# Patient Record
Sex: MALE | Race: WHITE | NOT HISPANIC OR LATINO | Employment: OTHER | URBAN - METROPOLITAN AREA
[De-identification: names, ages, dates, MRNs, and addresses within clinical notes are randomized per-mention and may not be internally consistent; named-entity substitution may affect disease eponyms.]

---

## 2019-04-04 ENCOUNTER — TELEPHONE (OUTPATIENT)
Dept: UROLOGY | Facility: MEDICAL CENTER | Age: 74
End: 2019-04-04

## 2019-05-01 ENCOUNTER — OFFICE VISIT (OUTPATIENT)
Dept: UROLOGY | Facility: AMBULATORY SURGERY CENTER | Age: 74
End: 2019-05-01
Payer: MEDICARE

## 2019-05-01 VITALS
BODY MASS INDEX: 34.16 KG/M2 | HEIGHT: 71 IN | WEIGHT: 244 LBS | HEART RATE: 48 BPM | DIASTOLIC BLOOD PRESSURE: 80 MMHG | SYSTOLIC BLOOD PRESSURE: 142 MMHG

## 2019-05-01 DIAGNOSIS — N40.1 BPH WITH OBSTRUCTION/LOWER URINARY TRACT SYMPTOMS: Primary | ICD-10-CM

## 2019-05-01 DIAGNOSIS — N13.8 BPH WITH OBSTRUCTION/LOWER URINARY TRACT SYMPTOMS: Primary | ICD-10-CM

## 2019-05-01 LAB
POST-VOID RESIDUAL VOLUME, ML POC: 65 ML
SL AMB  POCT GLUCOSE, UA: NORMAL
SL AMB LEUKOCYTE ESTERASE,UA: NORMAL
SL AMB POCT BILIRUBIN,UA: NORMAL
SL AMB POCT BLOOD,UA: NORMAL
SL AMB POCT CLARITY,UA: CLEAR
SL AMB POCT COLOR,UA: YELLOW
SL AMB POCT KETONES,UA: NORMAL
SL AMB POCT NITRITE,UA: NORMAL
SL AMB POCT PH,UA: 5
SL AMB POCT SPECIFIC GRAVITY,UA: 1.02
SL AMB POCT URINE PROTEIN: NORMAL
SL AMB POCT UROBILINOGEN: NORMAL

## 2019-05-01 PROCEDURE — 51798 US URINE CAPACITY MEASURE: CPT | Performed by: UROLOGY

## 2019-05-01 PROCEDURE — 99204 OFFICE O/P NEW MOD 45 MIN: CPT | Performed by: UROLOGY

## 2019-05-01 PROCEDURE — 81002 URINALYSIS NONAUTO W/O SCOPE: CPT | Performed by: UROLOGY

## 2019-05-01 RX ORDER — CIPROFLOXACIN 500 MG/1
500 TABLET, FILM COATED ORAL EVERY 12 HOURS SCHEDULED
Qty: 6 TABLET | Refills: 0 | Status: SHIPPED | OUTPATIENT
Start: 2019-05-01 | End: 2019-05-04

## 2019-05-01 RX ORDER — CANDESARTAN 16 MG/1
8 TABLET ORAL DAILY
Refills: 3 | COMMUNITY
Start: 2019-04-17

## 2019-05-02 ENCOUNTER — TELEPHONE (OUTPATIENT)
Dept: UROLOGY | Facility: AMBULATORY SURGERY CENTER | Age: 74
End: 2019-05-02

## 2019-07-15 ENCOUNTER — PROCEDURE VISIT (OUTPATIENT)
Dept: UROLOGY | Facility: AMBULATORY SURGERY CENTER | Age: 74
End: 2019-07-15
Payer: MEDICARE

## 2019-07-15 VITALS
WEIGHT: 244.8 LBS | SYSTOLIC BLOOD PRESSURE: 148 MMHG | HEART RATE: 82 BPM | DIASTOLIC BLOOD PRESSURE: 88 MMHG | HEIGHT: 71 IN | BODY MASS INDEX: 34.27 KG/M2

## 2019-07-15 DIAGNOSIS — N40.1 BPH WITH OBSTRUCTION/LOWER URINARY TRACT SYMPTOMS: Primary | ICD-10-CM

## 2019-07-15 DIAGNOSIS — R97.20 ELEVATED PSA: ICD-10-CM

## 2019-07-15 DIAGNOSIS — N13.8 BPH WITH OBSTRUCTION/LOWER URINARY TRACT SYMPTOMS: Primary | ICD-10-CM

## 2019-07-15 PROCEDURE — 55700 PR BIOPSY OF PROSTATE,NEEDLE/PUNCH: CPT | Performed by: UROLOGY

## 2019-07-15 PROCEDURE — G0416 PROSTATE BIOPSY, ANY MTHD: HCPCS | Performed by: PATHOLOGY

## 2019-07-15 PROCEDURE — 76872 US TRANSRECTAL: CPT | Performed by: UROLOGY

## 2019-07-15 PROCEDURE — 76942 ECHO GUIDE FOR BIOPSY: CPT | Performed by: UROLOGY

## 2019-07-15 RX ORDER — ESOMEPRAZOLE MAGNESIUM 40 MG/1
40 CAPSULE, DELAYED RELEASE ORAL DAILY
COMMUNITY
End: 2019-08-08

## 2019-07-15 RX ORDER — METOPROLOL SUCCINATE 50 MG/1
25 TABLET, EXTENDED RELEASE ORAL DAILY
COMMUNITY
Start: 2018-03-14 | End: 2020-04-28

## 2019-07-15 RX ORDER — OMEPRAZOLE 20 MG/1
20 CAPSULE, DELAYED RELEASE ORAL DAILY
COMMUNITY

## 2019-07-15 NOTE — PROGRESS NOTES
Prostate Biopsy note: The patient returns to the office today to undergo a transrectal ultrasound-guided biopsy of the prostate secondary to an elevated PSA  Risk and benefits of the procedure were discussed  Informed consent was obtained  The patient's prebiopsy preparation was deemed to be adequate  Antibiotics had been taken as prescribed  If appropriate blood thinners had been placed on hold  The patient completed an enema as prescribed  The patient was placed in the lateral decubitus position  Digital rectal examination was performed revealing a 35 gram prostate  Viscous lidocaine jelly was instilled into the rectum  Transrectal ultrasonography was then performed  The prostate measured 41 grams  No obvious mass visible on ultrasound  Central zone calcifications noted and bilateral adenomas  5 cc of 2% lidocaine were then injected bilaterally between the junction of the base of the prostate and the seminal vesicles  Ultrasound guidance was utilized to place the needle into proper position for the administration of the local anesthetic  A standard 12 core biopsy was then performed  Overall the patient tolerated the procedure and there were no complications

## 2019-07-26 ENCOUNTER — OFFICE VISIT (OUTPATIENT)
Dept: UROLOGY | Facility: AMBULATORY SURGERY CENTER | Age: 74
End: 2019-07-26
Payer: MEDICARE

## 2019-07-26 ENCOUNTER — TELEPHONE (OUTPATIENT)
Dept: UROLOGY | Facility: AMBULATORY SURGERY CENTER | Age: 74
End: 2019-07-26

## 2019-07-26 VITALS
BODY MASS INDEX: 34.3 KG/M2 | HEIGHT: 71 IN | SYSTOLIC BLOOD PRESSURE: 142 MMHG | WEIGHT: 245 LBS | DIASTOLIC BLOOD PRESSURE: 68 MMHG | HEART RATE: 68 BPM

## 2019-07-26 DIAGNOSIS — C61 PROSTATE CANCER (HCC): Primary | ICD-10-CM

## 2019-07-26 PROCEDURE — 99215 OFFICE O/P EST HI 40 MIN: CPT | Performed by: UROLOGY

## 2019-07-26 NOTE — TELEPHONE ENCOUNTER
Called radiation oncology to schedule a patient to see them before he comes back here for his 1 month  Had to leave a message  Will await their call back

## 2019-07-26 NOTE — PROGRESS NOTES
7/26/2019    Annika Chow  1945  [id number]        Assessment  [place] 8 prostate cancer    Plan  We had an extended discussion in the office today regarding his findings  Unfortunately was found to have [place] 8 prostate cancer in 2/12 cores  This was confirmed at [facility]  We discussed the grading and staging of prostate cancer in detail  They understand that this is considered high risk disease  Fortunately was relatively low volume a biopsy  Nevertheless CT scan for staging is indicated  According to [facility] nomogram he has 12% chance of lymph node involvement and 66% chance of extracapsular extension  We discussed treatment options if he has localized disease  Recommendation in his case would be for combined radiation therapy and androgen deprivation  We discussed risks and benefits of these treatments, including hot flashes, sexual side effects, local urinary and rectal issues related to radiation therapy  We discussed alternatives  Likely would be a candidate for gold marker placement as well as SpaceOAR if indicated  He will see Radiation Oncology in consultation  They asked about a second opinion and I would be more than happy to provide recommendations or they can seek on their own if desired  All questions answered to their satisfaction  Total visit time was 40 minutes of which over 50% was spent on counseling  History of Present Illness  Smita Arora is a 75 y o  male with history of elevated PSA 11 2 when corrected for finasteride use  He has been on finasteride and tamsulosin for many years  His prostate is not significantly enlarged  He continues to have urinary symptoms  He returns today for prostate biopsy results  Review of Systems  Review of Systems   Constitutional: Negative  HENT: Negative  Respiratory: Negative  Cardiovascular: Negative  Gastrointestinal: Negative      Genitourinary:        As per HPI   Musculoskeletal: Negative  Skin: Negative  Neurological: Negative  Hematological: Negative  Past Medical History  Past Medical History:   Diagnosis Date    Atrial fibrillation [facility])     Cardiac disease     Hypertension        Past Social History  Past Surgical History:   Procedure Laterality Date    APPENDECTOMY      KNEE ARTHROPLASTY      TONSILLECTOMY         Past Family History  No family history on file      Past Social history  Social History     Socioeconomic History    Marital status: /Civil Union     Spouse name: Not on file    Number of children: Not on file    Years of education: Not on file    Highest education level: Not on file   Occupational History    Not on file   Social Needs    Financial resource strain: Not on file    Food insecurity:     Worry: Not on file     Inability: Not on file    Transportation needs:     Medical: Not on file     Non-medical: Not on file   Tobacco Use    Smoking status: Never Smoker    Smokeless tobacco: Never Used   Substance and Sexual Activity    Alcohol use: No    Drug use: No    Sexual activity: Not on file   Lifestyle    Physical activity:     Days per week: Not on file     Minutes per session: Not on file    Stress: Not on file   Relationships    Social connections:     Talks on phone: Not on file     Gets together: Not on file     Attends Hinduism service: Not on file     Active member of club or organization: Not on file     Attends meetings of clubs or organizations: Not on file     Relationship status: Not on file    Intimate partner violence:     Fear of current or ex partner: Not on file     Emotionally abused: Not on file     Physically abused: Not on file     Forced sexual activity: Not on file   Other Topics Concern    Not on file   Social History Narrative    Not on file     Social History     Tobacco Use   Smoking Status Never Smoker   Smokeless Tobacco Never Used       Current Medications  Current Outpatient Medications Medication Sig Dispense Refill    candesartan (ATACAND) 16 mg tablet Take 8 mg by mouth daily   3    Cholecalciferol 2000 units CAPS Take 2,000 Units by mouth daily      cyanocobalamin (CVS VITAMIN B-12) 1000 MCG tablet Take 1,000 mcg by mouth daily      esomeprazole (NEXIUM) 40 MG capsule Take 40 mg by mouth daily      finasteride (PROSCAR) 5 mg tablet Take 5 mg by mouth daily   metoprolol succinate (TOPROL-XL) 50 mg 24 hr tablet Take 25 mg by mouth daily      Misc Natural Products (OSTEO BI-FLEX TRIPLE STRENGTH PO) Take by mouth      Multiple Vitamins-Minerals (ONE-A-DAY MENS 50+ ADVANTAGE PO) Take by mouth      nebivolol (BYSTOLIC) 5 mg tablet Take 5 mg by mouth 2 (two) times a day   omeprazole (PRILOSEC) 20 mg delayed release capsule Take 20 mg by mouth daily      rivaroxaban (XARELTO) tablet Take 20 mg by mouth daily   tamsulosin (FLOMAX) 0 4 mg Take 0 4 mg by mouth daily with dinner   diazepam (VALIUM) 5 mg tablet Take 1 tablet (5 mg total) by mouth every 8 (eight) hours as needed for muscle spasms  (Patient not taking: Reported on 7/15/2019) 15 tablet 0    docusate sodium (COLACE) 100 mg capsule Take 1 capsule (100 mg total) by mouth 2 (two) times a day  Prn constipation (Patient not taking: Reported on 5/1/2019) 20 capsule 0    ondansetron (ZOFRAN) 4 mg tablet Take 1 tablet (4 mg total) by mouth every 8 (eight) hours as needed for nausea or vomiting  (Patient not taking: Reported on 7/15/2019) 15 tablet 0    oxyCODONE-acetaminophen (PERCOCET) 5-325 mg per tablet Take 1 tablet by mouth every 4 (four) hours as needed for moderate pain  Max Daily Amount: 6 tablets (Patient not taking: Reported on 5/1/2019) 20 tablet 0     No current facility-administered medications for this visit          Allergies  Allergies   Allergen Reactions    Ace Inhibitors Cough    Pollen Extract        Past Medical History, Social History, Family History, medications and allergies were reviewed      Vitals  Vitals:    07/26/19 1058   BP: 142/68   BP Location: Left arm   Patient Position: Sitting   Cuff Size: Large   Pulse: 68   Weight: 111 kg (245 lb)   Height: 5' 11" (1 803 m)       Physical Exam  Physical Exam      Results  No results found for: PSA  Lab Results   Component Value Date    CALCIUM 8 8 07/11/2016    K 4 6 07/11/2016    CO2 30 07/11/2016     07/11/2016    BUN 23 07/11/2016    CREATININE 0 80 07/11/2016     Lab Results   Component Value Date    WBC 8 25 07/11/2016    HGB 12 9 07/11/2016    HCT 39 0 07/11/2016    MCV 98 07/11/2016     07/11/2016

## 2019-08-05 ENCOUNTER — APPOINTMENT (OUTPATIENT)
Dept: LAB | Facility: CLINIC | Age: 74
End: 2019-08-05
Payer: MEDICARE

## 2019-08-05 DIAGNOSIS — C61 PROSTATE CANCER (HCC): ICD-10-CM

## 2019-08-05 LAB
ANION GAP SERPL CALCULATED.3IONS-SCNC: 10 MMOL/L (ref 4–13)
BUN SERPL-MCNC: 17 MG/DL (ref 5–25)
CALCIUM SERPL-MCNC: 8.8 MG/DL (ref 8.3–10.1)
CHLORIDE SERPL-SCNC: 106 MMOL/L (ref 100–108)
CO2 SERPL-SCNC: 26 MMOL/L (ref 21–32)
CREAT SERPL-MCNC: 0.81 MG/DL (ref 0.6–1.3)
GFR SERPL CREATININE-BSD FRML MDRD: 87 ML/MIN/1.73SQ M
GLUCOSE SERPL-MCNC: 92 MG/DL (ref 65–140)
POTASSIUM SERPL-SCNC: 4.1 MMOL/L (ref 3.5–5.3)
SODIUM SERPL-SCNC: 142 MMOL/L (ref 136–145)

## 2019-08-05 PROCEDURE — 80048 BASIC METABOLIC PNL TOTAL CA: CPT

## 2019-08-05 PROCEDURE — 36415 COLL VENOUS BLD VENIPUNCTURE: CPT

## 2019-08-06 PROBLEM — C61 PROSTATE CANCER (HCC): Status: ACTIVE | Noted: 2019-08-06

## 2019-08-07 ENCOUNTER — HOSPITAL ENCOUNTER (OUTPATIENT)
Dept: CT IMAGING | Facility: HOSPITAL | Age: 74
Discharge: HOME | End: 2019-08-07
Attending: UROLOGY
Payer: MEDICARE

## 2019-08-07 DIAGNOSIS — C61 PROSTATE CANCER (HCC): ICD-10-CM

## 2019-08-07 PROCEDURE — 74177 CT ABD & PELVIS W/CONTRAST: CPT

## 2019-08-07 RX ADMIN — IOHEXOL 100 ML: 350 INJECTION, SOLUTION INTRAVENOUS at 16:58

## 2019-08-08 ENCOUNTER — CLINICAL SUPPORT (OUTPATIENT)
Dept: RADIATION ONCOLOGY | Facility: HOSPITAL | Age: 74
End: 2019-08-08
Attending: RADIOLOGY
Payer: MEDICARE

## 2019-08-08 VITALS
HEART RATE: 62 BPM | BODY MASS INDEX: 34.44 KG/M2 | SYSTOLIC BLOOD PRESSURE: 152 MMHG | HEIGHT: 71 IN | TEMPERATURE: 98.4 F | RESPIRATION RATE: 18 BRPM | DIASTOLIC BLOOD PRESSURE: 78 MMHG | WEIGHT: 246 LBS | OXYGEN SATURATION: 96 %

## 2019-08-08 DIAGNOSIS — C61 PROSTATE CANCER (HCC): Primary | ICD-10-CM

## 2019-08-08 PROCEDURE — 99211 OFF/OP EST MAY X REQ PHY/QHP: CPT | Performed by: RADIOLOGY

## 2019-08-08 NOTE — PROGRESS NOTES
Camille Edmonds [birth year] is a 75 y o  male      Patient is a 75 y o male with [place] 8 prostate cancer    Patient presented to Dr Greyson Peterson 5/1/19 to establish care after being treated by a Urologist in [place]  He has a history of bladder calculus and has been on finasteride and tamsulosin for several years  PSA in 2017 was 3 1, now 5 64  (11 2 correcting for finasteride use) recommend biopsy    3/19/19 PSA 5 64  7/15/19 Biopsy done [place] 8 in 2 cores (5% and 60%) with perineural invasion    7/26/19 Dr Greyson Peterson- CT scan for staging  If he has localized disease recommend combined radiation therapy and androgen therapy  Likely candidate for gold marker placement as well as SpaceOAR if indicated  See radiation oncology    8/7/19 CT abdomen pelvis  IMPRESSION:  No evidence of pathologic pelvic adenopathy or distant metastases  Fatty liver  Cholelithiasis  Bilateral renal cysts and punctate 1 mm nonobstructing left lower pole calculus  8/28/19 Dr Greyson Peterson - follow up, possible Lupron         Prostate cancer ([facility] [address] )    7/15/2019 Biopsy     A  Prostate, LLB, core needle biopsy:             - Prostatic adenocarcinoma, Ezra score 4 + 4 = 8, Prognostic Grade Group IV, involving 5% of one core biopsy (see Dr Mary Mc note in note section)  - No perineural invasion is identified              - No extracapsular extension is identified              - No lymphovascular invasion is identified      B  Prostate, LLM, core needle biopsy:             - Benign prostate glands  - No malignancy is identified      C  Prostate, LLA, core needle biopsy:             - Prostatic adenocarcinoma, Ezra score 4 + 4 = 8, Prognostic Grade Group IV, involving  60% of one core biopsy (see Dr Hernandez Cornea note in note section)  - Perineural invasion is identified              - No extracapsular extension is identified              - No lymphovascular invasion is identified      D   Prostate, LB, core needle biopsy:             - Benign prostate glands  - No malignancy is identified      E  Prostate, LM, core needle biopsy:             - Benign prostate glands  - No malignancy is identified      F  Prostate, LA, core needle biopsy:             - Benign prostate glands  - No malignancy is identified      G  Prostate, RB, core needle biopsy:             - enign prostate glands  - No malignancy is identified      H  Prostate, RM, core needle biopsy:             - Benign prostate glands  - No malignancy is identified      I  Prostate, RA, core needle biopsy:             - Benign prostate glands  - No malignancy is identified      J  Prostate, RL, core needle biopsy:             - Benign prostate glands  - No malignancy is identified      K  Prostate, RLM, core needle biopsy:             - Benign prostate glands  - No malignancy is identified      L  Prostate, RLA, ore needle biopsy:             - Benign prostate glands  - No malignancy is identified  [facility]  DIAGNOSIS     Prostate Biopsy:    - A  Prostatic adenocarcinoma, Ezra score 4+4=8 (grade group 4) involving 5% of one (1) core  - C  Prostatic adenocarcinoma, Ezra score 4+4=8 (grade group 4) involving 60% of one (1) core  Perineural invasion identified in this case        7/15/2019 Initial Diagnosis     Prostate cancer [facility])         Clinical Trial: no    Health Maintenance   Topic Date Due    Depression Screening PHQ  1945   Mahala Levy Medicare Annual Wellness Visit (AWV)  1945    CRC Screening: Colonoscopy  1945    BMI: Followup Plan  03/14/1962    Fall Risk  03/14/2009    Pneumococcal Vaccine: 65+ Years (1 of 2 - PCV13) 03/14/2009    DTaP,Tdap,and Td Vaccines (1 - Tdap) 11/04/2014    INFLUENZA VACCINE  07/01/2019    BMI: Adult  07/26/2020    Pneumococcal Vaccine: Pediatrics (0 to 5 Years) and At-Risk Patients (6 to 59 Years)  Aged Out    HEPATITIS B VACCINES  Aged Out       Past Medical History:   Diagnosis Date    Arthritis     At[facility] fibrillation ([facility] [address] )     Cardiac disease     Hypertension     Prostate cancer ([facility] [address] )        Past Surgical History:   Procedure Laterality Date    APPENDECTOMY      KNEE ARTHROPLASTY      TONSILLECTOMY         History reviewed  No pertinent family history  Social History     Tobacco Use    Smoking status: Never Smoker    Smokeless tobacco: Never Used   Substance Use Topics    Alcohol use: No    Drug use: No          Current Outpatient Medications:     candesartan (ATACAND) 16 mg tablet, Take 8 mg by mouth daily , Disp: , Rfl: 3    Cholecalciferol 2000 units CAPS, Take 2,000 Units by mouth daily, Disp: , Rfl:     cyanocobalamin (CVS VITAMIN B-12) 1000 MCG tablet, Take 1,000 mcg by mouth daily, Disp: , Rfl:     finasteride (PROSCAR) 5 mg tablet, Take 5 mg by mouth daily  , Disp: , Rfl:     metoprolol succinate (TOPROL-XL) 50 mg 24 hr tablet, Take 25 mg by mouth daily, Disp: , Rfl:     Misc Natural Products (OSTEO BI-FLEX TRIPLE STRENGTH PO), Take by mouth daily , Disp: , Rfl:     Multiple Vitamins-Minerals (ONE-A-DAY MENS 50+ ADVANTAGE PO), Take by mouth daily , Disp: , Rfl:     omeprazole (PRILOSEC) 20 mg delayed release capsule, Take 20 mg by mouth daily, Disp: , Rfl:     rivaroxaban (XARELTO) tablet, Take 20 mg by mouth daily  , Disp: , Rfl:     tamsulosin (FLOMAX) 0 4 mg, Take 0 4 mg by mouth daily with dinner , Disp: , Rfl:   No current facility-administered medications for this visit  Allergies   Allergen Reactions    Ace Inhibitors Cough    Pollen Extract         Review of Systems:  Review of Systems   Constitutional: Negative  HENT: Positive for hearing loss (bilateral hearing aids)  Eyes: Negative  Respiratory: Negative  Cardiovascular: Negative  Gastrointestinal: Negative           Reports occasional blood when wiping, has hx of hemorrhoids   Endocrine: Negative  Genitourinary: Positive for frequency  Inconsistent flow, nocturia x3-4   Musculoskeletal: Negative  Skin: Negative  Allergic/Immunologic: Positive for environmental allergies  Neurological: Positive for dizziness (occasional for 2 years, pt believes due to crystals in ears) and weakness (bilateral arms due to torn rotator cuffs)  Hematological: Negative  Psychiatric/Behavioral: Negative  Vitals:    08/08/19 1325   BP: 152/78   Pulse: 62   Resp: 18   Temp: 98 4 °F (36 9 °C)   SpO2: 96%   Weight: 112 kg (246 lb)   Height: 5' 11" (1 803 m)       Pain Score: 0-No pain    Imaging:No images are attached to the encounter       Teaching NCI RT packet given

## 2019-08-08 NOTE — PROGRESS NOTES
Consultation - Radiation Oncology     KWD:[id number] : 1945  Encounter: [id number]  Patient Information: [address]  Chief Complaint   Patient presents with   Aury Camejo Consult     radiation oncology     Cancer Staging  No matching staging information was found for the patient  History of Present Illness   Daphney Hull is a 75y o  year old male who presents with Patient is a 75 y o male with [place] 8 prostate cancer     Patient presented to Dr Aleksander Bang 19 to establish care after being treated by a Urologist in [place]  He has a history of bladder calculus and has been on finasteride and tamsulosin for several years  PSA in 2017 was 3 1, now 5 64  (11 2 correcting for finasteride use) recommend biopsy     3/19/19 PSA 5 64  7/15/19 Biopsy done [place] 8 in 2 cores (5% and 60%) with perineural invasion     19 Dr Courtney Meza to [facility] nomogram he has 12% chance of lymph node involvement and 66% chance of extracapsular extension  We discussed treatment options if he has localized disease  Recommendation in his case would be for combined radiation therapy and androgen deprivation CT scan for staging  If he has localized disease recommend combined radiation therapy and androgen therapy  Likely candidate for gold marker placement as well as SpaceOAR if indicated  See radiation oncology     19 CT abdomen pelvis  IMPRESSION:  No evidence of pathologic pelvic adenopathy or distant metastases  Fatty liver  Cholelithiasis  Bilateral renal cysts and punctate 1 mm nonobstructing left lower pole calculus      19 Dr Aleksander Bang - follow up, possible Lupron     He claims to have a weak urinary stream and takes Flomax  He has nocturia 3 times per night  He does however state that he drinks a lot of soda during the course of the day and evening    He has had colonoscopy in the past   He has sexual dysfunction    Historical Information      Prostate cancer ([facility] [address] )    7/15/2019 Biopsy     A  Prostate, LLB, core needle biopsy:             - Prostatic adenocarcinoma, [place] score 4 + 4 = 8, Prognostic Grade Group IV, involving 5% of one core biopsy (see Dr Sreekanth Dave note in note section)  - No perineural invasion is identified              - No extracapsular extension is identified              - No lymphovascular invasion is identified      B  Prostate, LLM, core needle biopsy:             - Benign prostate glands  - No malignancy is identified      C  Prostate, LLA, core needle biopsy:             - Prostatic adenocarcinoma, [place] score 4 + 4 = 8, Prognostic Grade Group IV, involving  60% of one core biopsy (see Dr Sreekanth Dave note in note section)  - Perineural invasion is identified              - No extracapsular extension is identified              - No lymphovascular invasion is identified      D  Prostate, LB, core needle biopsy:             - Benign prostate glands  - No malignancy is identified      E  Prostate, LM, core needle biopsy:             - Benign prostate glands  - No malignancy is identified      F  Prostate, LA, core needle biopsy:             - Benign prostate glands  - No malignancy is identified      G  Prostate, RB, core needle biopsy:             - enign prostate glands  - No malignancy is identified      H  Prostate, RM, core needle biopsy:             - Benign prostate glands  - No malignancy is identified      I  Prostate, RA, core needle biopsy:             - Benign prostate glands  - No malignancy is identified      J  Prostate, RL, core needle biopsy:             - Benign prostate glands  - No malignancy is identified      K  Prostate, RLM, core needle biopsy:             - Benign prostate glands  - No malignancy is identified      L   Prostate, RLA, ore needle biopsy:             - Benign prostate glands  - No malignancy is identified  [facility] LABORATORIES  DIAGNOSIS     Prostate Biopsy:    - A  Prostatic adenocarcinoma, [place] score 4+4=8 (grade group 4) involving 5% of one (1) core  - C  Prostatic adenocarcinoma, Ezra score 4+4=8 (grade group 4) involving 60% of one (1) core  Perineural invasion identified in this case  7/15/2019 Initial Diagnosis     Prostate cancer [facility])           Past Medical History:   Diagnosis Date    Arthritis     Atrial fibrillation ([address] )     Cardiac disease     Hypertension     Prostate cancer [facility])      Past Surgical History:   Procedure Laterality Date    APPENDECTOMY      KNEE ARTHROPLASTY      TONSILLECTOMY         History reviewed  No pertinent family history  Social History   Social History     Substance and Sexual Activity   Alcohol Use No     Social History     Substance and Sexual Activity   Drug Use No     Social History     Tobacco Use   Smoking Status Never Smoker   Smokeless Tobacco Never Used         Meds/Allergies     Current Outpatient Medications:     candesartan (ATACAND) 16 mg tablet, Take 8 mg by mouth daily , Disp: , Rfl: 3    Cholecalciferol 2000 units CAPS, Take 2,000 Units by mouth daily, Disp: , Rfl:     cyanocobalamin (CVS VITAMIN B-12) 1000 MCG tablet, Take 1,000 mcg by mouth daily, Disp: , Rfl:     finasteride (PROSCAR) 5 mg tablet, Take 5 mg by mouth daily  , Disp: , Rfl:     metoprolol succinate (TOPROL-XL) 50 mg 24 hr tablet, Take 25 mg by mouth daily, Disp: , Rfl:     Misc Natural Products (OSTEO BI-FLEX TRIPLE STRENGTH PO), Take by mouth daily , Disp: , Rfl:     Multiple Vitamins-Minerals (ONE-A-DAY MENS 50+ ADVANTAGE PO), Take by mouth daily , Disp: , Rfl:     omeprazole (PRILOSEC) 20 mg delayed release capsule, Take 20 mg by mouth daily, Disp: , Rfl:     rivaroxaban (XARELTO) tablet, Take 20 mg by mouth daily  , Disp: , Rfl:     tamsulosin (FLOMAX) 0 4 mg, Take 0 4 mg by mouth daily with dinner , Disp: , Rfl:   No current facility-administered medications for this visit  Allergies   Allergen Reactions    Ace Inhibitors Cough    Pollen Extract          Review of Systems  Constitutional: Negative  HENT: Positive for hearing loss (bilateral hearing aids)  Eyes: Negative  Respiratory: Negative  Cardiovascular: Negative  Gastrointestinal: Negative  Reports occasional blood when wiping, has hx of hemorrhoids   Endocrine: Negative  Genitourinary: Positive for frequency  Inconsistent flow, nocturia x3-4   Musculoskeletal: Negative  Skin: Negative  Allergic/Immunologic: Positive for environmental allergies  Neurological: Positive for dizziness (occasional for 2 years, pt believes due to crystals in ears) and weakness (bilateral arms due to torn rotator cuffs)  Hematological: Negative  Psychiatric/Behavioral: Negative    OBJECTIVE:   /78   Pulse 62   Temp 98 4 °F (36 9 °C)   Resp 18   Ht 5' 11" (1 803 m)   Wt 112 kg (246 lb)   SpO2 96%   BMI 34 31 kg/m²   Pain Assessment:  0  Performance Status: ECOG/Zubrod/WHO: 0 - Asymptomatic    Physical Exam   Constitutional: He appears well-developed  HENT:   Head: Normocephalic  Mouth/Throat: Oropharynx is clear and moist    Eyes: Pupils are equal, round, and reactive to light  EOM are normal    Neck: Normal range of motion  Neck supple  Cardiovascular: Normal rate, regular rhythm and normal heart sounds  Pulmonary/Chest: Effort normal and breath sounds normal  He has no wheezes  He exhibits no tenderness  Abdominal: Soft  Bowel sounds are normal  He exhibits no distension and no mass  There is no tenderness  Genitourinary: Rectum normal    Genitourinary Comments: Moderate size gland to palpation  No blood at the tip of the examining glove   Musculoskeletal: Normal range of motion  He exhibits no edema  Lymphadenopathy:     He has no cervical adenopathy  Neurological: He is alert   No cranial nerve deficit  Coordination normal    Skin: Skin is warm  No rash noted  No erythema  Psychiatric: He has a normal mood and affect  His behavior is normal           RESULTS  Lab Results    Chemistry        Component Value Date/Time    K 4 1 08/05/2019 1015     08/05/2019 1015    CO2 26 08/05/2019 1015    BUN 17 08/05/2019 1015    CREATININE 0 81 08/05/2019 1015        Component Value Date/Time    CALCIUM 8 8 08/05/2019 1015            Lab Results   Component Value Date    WBC 8 25 07/11/2016    HGB 12 9 07/11/2016    HCT 39 0 07/11/2016    MCV 98 07/11/2016     07/11/2016         Imaging Studies  Ct Abdomen Pelvis W Contrast    Result Date: 8/8/2019  Narrative: CT ABDOMEN AND PELVIS WITH IV CONTRAST INDICATION: C61: Malignant neoplasm of prostate  COMPARISON:  None TECHNIQUE:  CT examination of the abdomen and pelvis was performed  Axial, sagittal, and coronal 2D reformatted images were created from the source data and submitted for interpretation  Radiation dose length product (DLP) for this visit:  1562 mGy-cm   This examination, like all CT scans performed in the [facility], was performed utilizing techniques to minimize radiation dose exposure, including the use of iterative reconstruction and automated exposure control  IV Contrast:  100 mL of iohexol (OMNIPAQUE) Enteric Contrast:  Enteric contrast was not administered  FINDINGS: ABDOMEN LOWER CHEST:  Linear subsegmental atelectasis or scarring medial left lower lobe  Coronary artery calcifications  Borderline cardiomegaly with right ventricular dilatation suggested  3 mm density in the right middle lobe, favored to represent a prominent vascular confluence on coronal imaging  LIVER/BILIARY TREE:  Fatty infiltration  No suspicious mass or intrahepatic duct dilatation detected  GALLBLADDER:  Tiny calcified gallstones  No pericholecystic inflammatory change  SPLEEN:  Unremarkable   PANCREAS:  Mild fatty involution of the pancreas without acute findings  ADRENAL GLANDS:  Unremarkable  KIDNEYS/URETERS:  Bilateral renal cysts and subcentimeter left lower pole hypodensity too small to characterize  There is a 1 mm nonobstructing left lower pole calyceal calculus on series 601 image 98  No hydronephrosis  STOMACH AND BOWEL:  Unremarkable  APPENDIX:  No findings to suggest appendicitis  ABDOMINOPELVIC CAVITY:  No ascites or free intraperitoneal air  No pathologic lymphadenopathy  Subcentimeter bilateral sidewall short axis lymph nodes are noted on image 84 series 2  VESSELS: Atherosclerotic changes abdominal aorta without evidence of aneurysm  PELVIS REPRODUCTIVE ORGANS:  Mildly enlarged prostate  No discernible macroscopic capsular distorting masses  The seminal vesicles appear symmetric  URINARY BLADDER:  The urinary bladder is suboptimally distended with resultant mild wall prominence  No intraluminal pathology seen  ABDOMINAL WALL/INGUINAL REGIONS:  Unremarkable  OSSEOUS STRUCTURES:  No acute fracture or destructive osseous lesion  Diffuse degenerative changes throughout the spine with disc disease most notable in the lumbar region at L4-5 and L5-S1  Grade 1 anterolisthesis L4 on L5 appears secondary to facet arthropathy  Impression: No evidence of pathologic pelvic adenopathy or distant metastases  Fatty liver  Cholelithiasis  Bilateral renal cysts and punctate 1 mm nonobstructing left lower pole calculus  Workstation performed: [id number]         Pathology:    Final Diagnosis   A  Prostate, LLB, core needle biopsy:             - Prostatic adenocarcinoma, Ezra score 4 + 4 = 8, Prognostic Grade Group IV, involving 5% of one core biopsy (see Dr Lauryn Abdullahi note in note section)  - No perineural invasion is identified              - No extracapsular extension is identified              - No lymphovascular invasion is identified      B  Prostate, LLM, core needle biopsy:             - Benign prostate glands  - No malignancy is identified      C  Prostate, LLA, core needle biopsy:             - Prostatic adenocarcinoma, [place] score 4 + 4 = 8, Prognostic Grade Group IV, involving  60% of one core biopsy (see Dr Edna Butterfield note in note section)  - Perineural invasion is identified              - No extracapsular extension is identified              - No lymphovascular invasion is identified      D  Prostate, LB, core needle biopsy:             - Benign prostate glands  - No malignancy is identified      E  Prostate, LM, core needle biopsy:             - Benign prostate glands  - No malignancy is identified      F  Prostate, LA, core needle biopsy:             - Benign prostate glands  - No malignancy is identified      G  Prostate, RB, core needle biopsy:             - enign prostate glands  - No malignancy is identified      H  Prostate, RM, core needle biopsy:             - Benign prostate glands  - No malignancy is identified      I  Prostate, RA, core needle biopsy:             - Benign prostate glands  - No malignancy is identified      J  Prostate, RL, core needle biopsy:             - Benign prostate glands  - No malignancy is identified      K  Prostate, RLM, core needle biopsy:             - Benign prostate glands  - No malignancy is identified      L  Prostate, RLA, ore needle biopsy:             - Benign prostate glands  - No malignancy is identified             ASSESSMENT  1  Prostate cancer [facility])       Cancer Staging  No matching staging information was found for the patient  PLAN/DISCUSSION  No orders of the defined types were placed in this encounter  Mela Malagon is a 75y o  year old male with Ezra 8 prostate carcinoma  He has high risk based on his Ezra score however does have high risk low volume disease along with PSA less than 10   CT scan is without any evidence of extraprostatic disease  We discussed options for localized high risk prostate cancer  We discussed with his [place] score observation would not be recommended  He had questions regarding prostatectomy however is already met with Dr Teddy Moore   We discussed typically at his age prostatectomy is not recommended  In addition with his [place] 8 disease he has risk of extraprostatic extension in therefore radiation treatment along with ADT would be recommended  I discussed with him and his wife in detail the procedure involved with fiducial marker placement to assist with IGRT  His risk of pelvic lymph node involvement is 12%  We discussed local treatment to his prostate and proximal seminal vesicles  Utilizing a RapidArc technique  Possible acute as well as long-term side effects were reviewed with him in great detail  This can include but not limited to fatigue, decreased urinary stream, urinary frequency/urgency, proctitis, sexual dysfunction, cystitis  We also discussed the utilization of spaceoar to further reduce his risk of proctitis  We discussed various radiation treatment schemes  In addition he had multiple questions regarding proton therapy which I believe I have answered to his satisfaction  He is scheduled for 2nd opinion at [facility]  Should he wish to proceed with radiation our facility we discussed he should contact us so that we can work out the timing of his fiducial marker in space or as he also will be receiving neoadjuvant ADT therapy  Neva Larson MD  4/5/9185,8:14 PM      Portions of the record may have been created with voice recognition software   Occasional wrong word or "sound a like" substitutions may have occurred due to the inherent limitations of voice recognition software   Read the chart carefully and recognize, using context, where substitutions have occurred

## 2019-08-16 ENCOUNTER — TELEPHONE (OUTPATIENT)
Dept: UROLOGY | Facility: AMBULATORY SURGERY CENTER | Age: 74
End: 2019-08-16

## 2019-08-16 NOTE — TELEPHONE ENCOUNTER
Patient seen 07/26/2019 by Dr Alanna Fernandez for discussion of prostate biopsy results  Patient found to have Ezra 8 prostate cancer  Patient seeked second opinion at [facility] and wishes to contiue his care with [facility]  Plan to cancel appointment with Dr Alanna Fernandez for 08/28/2019

## 2019-08-16 NOTE — TELEPHONE ENCOUNTER
Received a release of health information form signed to send records to patient and to daniel massey to Etienne Mcmahon at [phone]  Called and spoke to wife that records are ready to get picked up, they would like the records mailed to them  I mailed them per their request  Also they said that they will be sticking with [facility] and would like to know if its still necessary to come to the appointment on 8/28 with Dr John Jameson  Please advise

## 2019-08-21 ENCOUNTER — DOCUMENTATION (OUTPATIENT)
Dept: UROLOGY | Facility: AMBULATORY SURGERY CENTER | Age: 74
End: 2019-08-21

## 2019-08-21 NOTE — PROGRESS NOTES
Oncology Navigator Note: Multidisciplinary Urology Case Review 8/20/19  Physician Recommended Plan    Baldev Danielson is a 75 y o  male    Diagnosis: Prostate Cancer, Ezra 8    Patient was discussed at the Multidisciplinary Urology Case review on 8/20/19  The group agreed with the plan for radiation therapy, spaceoar, fiducial markers and androgen deprivation therapy

## 2021-01-20 DIAGNOSIS — Z23 ENCOUNTER FOR IMMUNIZATION: ICD-10-CM

## 2021-01-24 ENCOUNTER — IMMUNIZATIONS (OUTPATIENT)
Dept: FAMILY MEDICINE CLINIC | Facility: HOSPITAL | Age: 76
End: 2021-01-24

## 2021-01-24 DIAGNOSIS — Z23 ENCOUNTER FOR IMMUNIZATION: Primary | ICD-10-CM

## 2021-01-24 PROCEDURE — 0011A SARS-COV-2 / COVID-19 MRNA VACCINE (MODERNA) 100 MCG: CPT

## 2021-01-24 PROCEDURE — 91301 SARS-COV-2 / COVID-19 MRNA VACCINE (MODERNA) 100 MCG: CPT

## 2021-02-22 ENCOUNTER — IMMUNIZATIONS (OUTPATIENT)
Dept: FAMILY MEDICINE CLINIC | Facility: HOSPITAL | Age: 76
End: 2021-02-22

## 2021-02-22 DIAGNOSIS — Z23 ENCOUNTER FOR IMMUNIZATION: Primary | ICD-10-CM

## 2021-02-22 PROCEDURE — 0012A SARS-COV-2 / COVID-19 MRNA VACCINE (MODERNA) 100 MCG: CPT

## 2021-02-22 PROCEDURE — 91301 SARS-COV-2 / COVID-19 MRNA VACCINE (MODERNA) 100 MCG: CPT

## 2022-01-06 ENCOUNTER — TELEPHONE (OUTPATIENT)
Dept: GASTROENTEROLOGY | Facility: CLINIC | Age: 77
End: 2022-01-06

## 2022-01-06 NOTE — TELEPHONE ENCOUNTER
Recall call went out via [employer] in 2020 with no return calls from pt to schedule  Pt is due for an EGD with Dr Stas Swanson for hx of Dumont's/GERD/dysphagia  I called and spoke to pt whom informed that he wants to speak to his heart doctor before scheduling  If he says ok to schedule, he will call us back

## 2024-03-05 ENCOUNTER — NEW REFERRAL (OUTPATIENT)
Dept: URBAN - METROPOLITAN AREA CLINIC 27 | Facility: CLINIC | Age: 79
End: 2024-03-05

## 2024-03-05 DIAGNOSIS — H43.813: ICD-10-CM

## 2024-03-05 DIAGNOSIS — H33.302: ICD-10-CM

## 2024-03-05 DIAGNOSIS — H25.9: ICD-10-CM

## 2024-03-05 PROCEDURE — 92004 COMPRE OPH EXAM NEW PT 1/>: CPT

## 2024-03-05 PROCEDURE — 92134 CPTRZ OPH DX IMG PST SGM RTA: CPT

## 2024-03-05 PROCEDURE — 92201 OPSCPY EXTND RTA DRAW UNI/BI: CPT

## 2024-03-05 ASSESSMENT — VISUAL ACUITY
OS_CC: 20/50
OS_PH: 20/30-2
OD_CC: 20/25

## 2024-03-05 ASSESSMENT — TONOMETRY
OS_IOP_MMHG: 15
OD_IOP_MMHG: 15

## 2024-04-16 ENCOUNTER — TELEPHONE (OUTPATIENT)
Dept: GASTROENTEROLOGY | Facility: CLINIC | Age: 79
End: 2024-04-16

## 2024-04-16 NOTE — TELEPHONE ENCOUNTER
Pt needs ov to discuss due to age - 5 yr colon recall. Hist of polyps. Dr Edward pt. I lmom for pt to please call back to schedule an office visit to discuss a possible recall colonoscopy.  Will call pt again if do not hear back from him.

## 2024-04-23 NOTE — TELEPHONE ENCOUNTER
I lmom for pt to please call back to schedule an ov to discuss possible repeat colonoscopy. Will call again if do not hear back from him.

## 2024-04-30 ENCOUNTER — TELEPHONE (OUTPATIENT)
Age: 79
End: 2024-04-30

## 2024-06-21 ENCOUNTER — FOLLOW UP (OUTPATIENT)
Dept: URBAN - METROPOLITAN AREA CLINIC 27 | Facility: CLINIC | Age: 79
End: 2024-06-21

## 2024-06-21 DIAGNOSIS — H43.813: ICD-10-CM

## 2024-06-21 DIAGNOSIS — H33.302: ICD-10-CM

## 2024-06-21 DIAGNOSIS — Z96.1: ICD-10-CM

## 2024-06-21 PROCEDURE — 92012 INTRM OPH EXAM EST PATIENT: CPT

## 2024-06-21 ASSESSMENT — VISUAL ACUITY
OD_SC: 20/20-2
OS_SC: 20/25-1

## 2024-06-21 ASSESSMENT — TONOMETRY
OD_IOP_MMHG: 21
OS_IOP_MMHG: 20

## 2024-09-20 ENCOUNTER — FOLLOW UP (OUTPATIENT)
Dept: URBAN - METROPOLITAN AREA CLINIC 27 | Facility: CLINIC | Age: 79
End: 2024-09-20

## 2024-09-20 DIAGNOSIS — H33.302: ICD-10-CM

## 2024-09-20 PROCEDURE — 92014 COMPRE OPH EXAM EST PT 1/>: CPT

## 2024-09-20 PROCEDURE — 92201 OPSCPY EXTND RTA DRAW UNI/BI: CPT

## 2024-09-20 ASSESSMENT — VISUAL ACUITY
OD_PH: 20/20-2
OS_PH: 20/20
OS_CC: 20/20-2
OD_CC: 20/30+2

## 2024-09-20 ASSESSMENT — TONOMETRY
OD_IOP_MMHG: 13
OS_IOP_MMHG: 13